# Patient Record
Sex: MALE | Race: WHITE | NOT HISPANIC OR LATINO | Employment: FULL TIME | URBAN - METROPOLITAN AREA
[De-identification: names, ages, dates, MRNs, and addresses within clinical notes are randomized per-mention and may not be internally consistent; named-entity substitution may affect disease eponyms.]

---
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
## 2021-05-13 NOTE — PROGRESS NOTES
Progress Note      Patient Name: Jimmy Thompson   Patient ID: [id number]   Sex: Male   YOB: 2000    Primary Care Provider: Gabriel Wing MD   Referring Provider: Gabriel Wing MD    Visit Date: May 14, 2020    Provider: Skye Baca MD   Location: [facility]   Location Address: [address] [place] [address]  [id number]   Location Phone: [phone]          Chief Complaint  · Burning in Chest x 1 week      History Of Present Illness  Jimmy Thompson is a 20 year old /White male who presents for evaluation and treatment of:      C/O burning sensation in chest x 1 week  some increased tightness    has hx of asthma    took albuterol inhaler and this did not seem to help    Has been around a lot of smokers lately    Denies fever, cough, nasal congestion, sore throat, body aches       Past Medical History  Disease Name Date Onset Notes   Acne --  --    Allergic rhinitis --  --    Asthma --  --    Broken Bones --  left wrist, right heel, right ankle, right foot, nasal   Forgetfulness --  --    Head injury 2016 --    Left knee pain --  --    Migraine Headaches --  --    Seizures --  last seizure age 4. benigin rolandic seizures.   Shortness of breath --  --    Wears glasses --  --          Past Surgical History  Procedure Name Date Notes   Adenoidectomy 2003 --    Ear Tubes 2003 --    nose 2003 caterization   Tonsilectomy 2003 --          Medication List  Name Date Started Instructions   albuterol sulfate 90 mcg/actuation inhalation HFA aerosol inhaler 11/05/2019 inhale 1 puff (90 mcg) by inhalation route every 6 hours as needed         Allergy List  Allergen Name Date Reaction Notes   NO KNOWN DRUG ALLERGIES --  --  --          Family Medical History  Disease Name Relative/Age Notes   Thyroid Gland Neoplasm, Malignant  --    Diabetes mellitus, type II  --          Social History  Finding Status Start/Stop Quantity Notes   Alcohol Never --/-- --   --    Heavy Amount of Exercise (4 or more times weekly) --  --/-- --  --    Tobacco Never --/-- --  --          Immunizations  NameDate Admin Mfg Trade Name Lot Number Route Inj VIS Given VIS Publication   DTaP01/28/2005 NE Not Entered  NE NE     Comments:    DTaP06/11/2002 NE Not Entered  NE NE     Comments:    DTaP2001 NE Not Entered  NE NE     Comments:    DTaP2001 NE Not Entered  NE NE     Comments:    DTaP2001 NE Not Entered  NE NE     Comments:    Hepatitis A06/29/2018 [facility] Havrix Peds 2 dose [id number] IM LD 06/29/2018 07/20/2016   Comments: pt tolerated well, left office in stable condition   Hepatitis  NE Not Entered  NE NE     Comments:    Hepatitis  NE Not Entered  NE NE     Comments:    Hepatitis  NE Not Entered  NE NE     Comments:    Hib06/11/2002 NE Not Entered  NE NE     Comments:    Hib2001 NE Not Entered  NE NE     Comments:    Hib2001 NE Not Entered  NE NE     Comments:    Hib2001 NE Not Entered  NE NE     Comments:    Mjetpuxep45/25/2016 [facility] Fluarix-PF > 3 Years [id number] IM LD 10/25/2016 07/02/2012   Comments: pt tolerated well   IPV2001 NE Not Entered  NE NE     Comments:    IPV2001 NE Not Entered  NE NE     Comments:    IPV2001 NE Not Entered  NE NE     Comments:    Meningococcal (MNG)07/18/2017 NE Not Entered  NE NE     Comments:    Meningococcal (MNG)02/20/2012 NE Not Entered  NE NE     Comments:    MMR01/28/2005 NE Not Entered  NE NE     Comments:    MMR04/24/2002 NE Not Entered  NE NE     Comments:    Prevnar 132001 NE Not Entered  NE NE     Comments:    Prevnar 132001 NE Not Entered  NE NE     Comments:    Prevnar 132001 NE Not Entered  NE NE     Comments:    Tdap02/20/2012 NE Not Entered  NE NE     Comments:    Dckfszsdy40/23/2008 NE Not Entered  NE NE     Comments:    Wducojlmh09/11/2002 NE Not Entered  NE NE     Comments:          Review of Systems  · Constitutional  o Denies  o : fever,  "fatigue, weight loss, weight gain  · HENT  o Denies  o : nasal congestion, sore throat  · Cardiovascular  o Denies  o : lower extremity edema, claudication, chest pressure, palpitations  · Respiratory  o Denies  o : frequent cough, shortness of breath  · Gastrointestinal  o Denies  o : nausea, vomiting, diarrhea, constipation, abdominal pain      Vitals  Date Time BP Position Site L\R Cuff Size HR RR TEMP (F) WT  HT  BMI kg/m2 BSA m2 O2 Sat HC       05/08/2019 09:13 /72 Sitting    71 - R 12 98.6 128lbs 0oz 5'  2\" 23.41 1.59 100 %    09/23/2019 11:09 /62 Sitting    74 - R 15 98.6 120lbs 0oz 5'  2\" 21.95 1.54 100 %    05/14/2020 03:20 /64 Sitting    85 - R  98.9 125lbs 0oz 5'  2\" 22.86 1.57 98 %          Physical Examination  · Constitutional  o Appearance  o : no acute distress, well-nourished  · Head and Face  o Head  o :   § Inspection  § : atraumatic, normocephalic  · Eyes  o Eyes  o : extraocular movements intact, no scleral icterus, no conjunctival injection  · Ears, Nose, Mouth and Throat  o Ears  o :   § External Ears  § : normal  § Otoscopic Examination  § : tympanic membrane appearance within normal limits bilaterally  o Nose  o :   § Intranasal Exam  § : nares patent  o Oral Cavity  o :   § Oral Mucosa  § : moist mucous membranes  o Throat  o :   § Oropharynx  § : no inflammation or lesions present, tonsils within normal limits  · Respiratory  o Respiratory Effort  o : breathing comfortably, symmetric chest rise  o Auscultation of Lungs  o : clear to asculatation bilaterally, no wheezes, rales, or rhonchii  · Cardiovascular  o Heart  o :   § Auscultation of Heart  § : regular rate and rhythm, no murmurs, rubs, or gallops  o Peripheral Vascular System  o :   § Extremities  § : no edema  · Lymphatic  o Neck  o : no lymphadenopathy present  · Neurologic  o Mental Status Examination  o :   § Orientation  § : grossly oriented to person, place and time  o Gait and Station  o :   § Gait " Screening  § : normal gait  · Psychiatric  o General  o : normal mood and affect          Assessment  · Chest tightness     786.59/R07.89  patient with hx of asthma  no wheezing or other abnormal exam findings, vital signs normal  will treat with inhaled steroid, continue rescue inhaler as needed  call or return to clinic if worsening or develops new symptoms    Problems Reconciled  Plan  · Orders  o ACO-39: Current medications updated and reviewed () - - 05/14/2020  · Medications  o Qvar RediHaler 40 mcg/actuation inhalation HFA aerosol breath activated   SIG: inhale 1 puff (40 mcg) by inhalation route 2 times per day   DISP: (1) Hfa aerosol breath activated with 0 refills  Prescribed on 05/14/2020     o Medications have been Reconciled  o Transition of Care or Provider Policy  · Instructions  o Take all medications as prescribed/directed.  o Patient was educated/instructed on their diagnosis, treatment and medications prior to discharge from the clinic today.  o Patient instructed to seek medical attention urgently for new or worsening symptoms.  o Call the office with any concerns or questions.  · Disposition  o Call or Return if symptoms worsen or persist.  o follow up as needed            Electronically Signed by: Skye Baca MD -Author on May 14, 2020 03:35:12 PM

## 2021-05-15 VITALS
HEART RATE: 71 BPM | RESPIRATION RATE: 12 BRPM | TEMPERATURE: 98.6 F | HEIGHT: 62 IN | OXYGEN SATURATION: 100 % | SYSTOLIC BLOOD PRESSURE: 116 MMHG | BODY MASS INDEX: 23.55 KG/M2 | DIASTOLIC BLOOD PRESSURE: 72 MMHG | WEIGHT: 128 LBS

## 2021-05-15 VITALS
HEART RATE: 74 BPM | OXYGEN SATURATION: 100 % | DIASTOLIC BLOOD PRESSURE: 62 MMHG | TEMPERATURE: 98.6 F | SYSTOLIC BLOOD PRESSURE: 148 MMHG | WEIGHT: 120 LBS | RESPIRATION RATE: 15 BRPM | BODY MASS INDEX: 22.08 KG/M2 | HEIGHT: 62 IN

## 2021-05-15 VITALS
HEART RATE: 85 BPM | SYSTOLIC BLOOD PRESSURE: 108 MMHG | HEIGHT: 62 IN | BODY MASS INDEX: 23 KG/M2 | WEIGHT: 125 LBS | OXYGEN SATURATION: 98 % | DIASTOLIC BLOOD PRESSURE: 64 MMHG | TEMPERATURE: 98.9 F

## 2021-05-16 VITALS
HEART RATE: 59 BPM | HEIGHT: 62 IN | RESPIRATION RATE: 16 BRPM | DIASTOLIC BLOOD PRESSURE: 58 MMHG | TEMPERATURE: 98.7 F | OXYGEN SATURATION: 99 % | SYSTOLIC BLOOD PRESSURE: 132 MMHG | WEIGHT: 118.25 LBS | BODY MASS INDEX: 21.76 KG/M2

## 2021-05-16 VITALS
SYSTOLIC BLOOD PRESSURE: 128 MMHG | WEIGHT: 126.25 LBS | BODY MASS INDEX: 23.23 KG/M2 | OXYGEN SATURATION: 100 % | RESPIRATION RATE: 15 BRPM | HEIGHT: 62 IN | HEART RATE: 83 BPM | TEMPERATURE: 99 F | DIASTOLIC BLOOD PRESSURE: 72 MMHG

## 2021-05-16 VITALS — WEIGHT: 123 LBS | RESPIRATION RATE: 18 BRPM | HEIGHT: 62 IN | BODY MASS INDEX: 22.63 KG/M2

## 2021-05-16 VITALS
SYSTOLIC BLOOD PRESSURE: 112 MMHG | HEART RATE: 71 BPM | TEMPERATURE: 98.8 F | OXYGEN SATURATION: 100 % | DIASTOLIC BLOOD PRESSURE: 64 MMHG | WEIGHT: 124.5 LBS | HEIGHT: 62 IN | RESPIRATION RATE: 16 BRPM | BODY MASS INDEX: 22.91 KG/M2

## 2021-05-16 VITALS
BODY MASS INDEX: 22.63 KG/M2 | HEART RATE: 60 BPM | DIASTOLIC BLOOD PRESSURE: 84 MMHG | SYSTOLIC BLOOD PRESSURE: 132 MMHG | WEIGHT: 123 LBS | HEIGHT: 62 IN | OXYGEN SATURATION: 100 % | TEMPERATURE: 98.2 F

## 2021-05-16 VITALS — HEIGHT: 62 IN | WEIGHT: 126 LBS | BODY MASS INDEX: 23.19 KG/M2 | RESPIRATION RATE: 16 BRPM

## 2021-05-16 VITALS — RESPIRATION RATE: 12 BRPM | BODY MASS INDEX: 22.82 KG/M2 | WEIGHT: 124 LBS | HEIGHT: 62 IN

## 2021-05-16 VITALS — BODY MASS INDEX: 22.63 KG/M2 | HEIGHT: 62 IN | WEIGHT: 123 LBS | RESPIRATION RATE: 16 BRPM

## 2021-07-30 ENCOUNTER — APPOINTMENT (OUTPATIENT)
Dept: GENERAL RADIOLOGY | Facility: HOSPITAL | Age: 21
End: 2021-07-30

## 2021-07-30 ENCOUNTER — HOSPITAL ENCOUNTER (EMERGENCY)
Facility: HOSPITAL | Age: 21
Discharge: HOME | End: 2021-07-30
Attending: EMERGENCY MEDICINE | Admitting: EMERGENCY MEDICINE

## 2021-07-30 VITALS
BODY MASS INDEX: 24.14 KG/M2 | WEIGHT: 131.17 LBS | DIASTOLIC BLOOD PRESSURE: 72 MMHG | TEMPERATURE: 97.7 F | HEIGHT: 62 IN | RESPIRATION RATE: 16 BRPM | HEART RATE: 67 BPM | OXYGEN SATURATION: 100 % | SYSTOLIC BLOOD PRESSURE: 136 MMHG

## 2021-07-30 DIAGNOSIS — S66.912A SPRAIN AND STRAIN OF LEFT WRIST: ICD-10-CM
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
## 2021-07-30 DIAGNOSIS — S63.502A SPRAIN AND STRAIN OF LEFT WRIST: ICD-10-CM

## 2021-07-30 PROCEDURE — 99283 EMERGENCY DEPT VISIT LOW MDM: CPT

## 2021-07-30 PROCEDURE — 73110 X-RAY EXAM OF WRIST: CPT

## 2021-07-30 RX ORDER — DICLOFENAC SODIUM 75 MG/1
75 TABLET, DELAYED RELEASE ORAL 2 TIMES DAILY PRN
Qty: 20 TABLET | Refills: 0 | OUTPATIENT
Start: 2021-07-30 | End: 2022-12-28

## 2021-08-02 ENCOUNTER — TELEPHONE (OUTPATIENT)
Dept: ORTHOPEDIC SURGERY | Facility: CLINIC | Age: 21
End: 2021-08-02

## 2021-08-03 ENCOUNTER — OFFICE VISIT (OUTPATIENT)
Dept: ORTHOPEDIC SURGERY | Facility: CLINIC | Age: 21
End: 2021-08-03

## 2021-08-03 VITALS — BODY MASS INDEX: 24.11 KG/M2 | HEART RATE: 91 BPM | OXYGEN SATURATION: 97 % | WEIGHT: 131 LBS | HEIGHT: 62 IN

## 2021-08-03 DIAGNOSIS — M25.532 LEFT WRIST PAIN: ICD-10-CM

## 2021-08-03 PROCEDURE — 99203 OFFICE O/P NEW LOW 30 MIN: CPT | Performed by: STUDENT IN AN ORGANIZED HEALTH CARE EDUCATION/TRAINING PROGRAM

## 2021-08-03 PROCEDURE — 25600 CLTX DST RDL FX/EPHYS SEP WO: CPT | Performed by: STUDENT IN AN ORGANIZED HEALTH CARE EDUCATION/TRAINING PROGRAM

## 2021-08-03 NOTE — PROGRESS NOTES
"Chief Complaint  Pain of the Left Wrist    Subjective          Jimmy Thompson presents to [facility] for   History of Present Illness    Jimmy presents today for an evaluation of left wrist pain. 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 Went to the ER on 7/31/21 where he recieved xrays and was wrapped in a splint. Pain is moderate but controlled. Doing ok with splint. Right hand dominant. He is a  at [employer] and part time  at a gas station. Overall health is good. No diabetes.   No Known Allergies     Social History     Socioeconomic History   • Marital status: Single     Spouse name: Not on file   • Number of children: Not on file   • Years of education: Not on file   • Highest education level: Not on file   Tobacco Use   • Smoking status: Never Smoker   • Smokeless tobacco: Never Used   Vaping Use   • Vaping Use: Never used   Substance and Sexual Activity   • Alcohol use: Never   • Sexual activity: Defer        I reviewed the patient's chief complaint, history of present illness, review of systems, past medical history, surgical history, family history, social history, medications, and allergy list.     REVIEW OF SYSTEMS    Constitutional: Denies fevers, chills, weight loss  Cardiovascular: Denies chest pain, shortness of breath  Skin: Denies rashes, acute skin changes  Neurologic: Denies headache, loss of consciousness  MSK: left wrist pain      Objective   Vital Signs:   Pulse 91   Ht 157.5 cm (62\")   Wt 59.4 kg (131 lb)   SpO2 97%   BMI 23.96 kg/m²     Body mass index is 23.96 kg/m².    Physical Exam    General: Alert, no acute distress  Left upper extremity: short arm splint removed. Mild tenderness over the radial styloid. Mild swelling on radial aspect of the wrist. Moderately tender to palpation over radial snuff box. Nontender over the DRUJ, distal ulna. Mild tenderness over scaphoid " tubercle. 45 degree wrist extension. 45 degree wrist flexion with pain. Full finger flexion and extension. Sensation intact in the  median, radial, ulnar nerve distributions. Palpable radial pulse. Nontender over over radial head, olecranon, medial and lateral epicondyle. Full elbow flexion and extension.     Orthopedic Injury Treatment    Date/Time: 8/3/2021 9:41 AM  Performed by: Bismark Graves MD  Authorized by: Bismark Graves MD   Injury location: finger  Location details: left thumb  Pre-procedure neurovascular assessment: neurovascularly intact    Anesthesia:  Local anesthesia used: no    Sedation:  Patient sedated: no    Immobilization: cast  Supplies used: cotton padding (FIBERGLASS CAST)  Post-procedure neurovascular assessment: post-procedure neurovascularly intact  Patient tolerance: patient tolerated the procedure well with no immediate complications  Comments: Closed treatment was obtained and fiberglass cast was applied.  The patient tolerated the procedure without any complications. APPLIED BY GRIFFIN ESPARZA MA          Imaging Results (Most Recent)     None              Assessment and Plan    Diagnoses and all orders for this visit:    1. Left wrist pain    Other orders  -     Orthopedic Injury Treatment        No obvious fracture noted radiographically, but tender to palpation over the radial snuffbox and scaphoid tubercle. We discussed thumb spica immobilization for 2 weeks and re evaluation. Patient expressed understanding and agreed to proceed. Thumb spica cast applied today. Patient tolerated well with no complications. Follow up in 2 weeks. We will remove the cast and obtain new XR of the left wrist when he returns.     Scribed for Bismark Graves MD by Breanna Joshi MA.  08/03/21   08:17 EDT    Follow Up {Instructions Charge Capture  Follow-up Communications :23}  Return in about 2 weeks (around 8/17/2021).   Follow up in 2 weeks  Patient was given instructions and counseling regarding his  condition or for health maintenance advice. Please see specific information pulled into the AVS if appropriate.

## 2021-08-18 ENCOUNTER — OFFICE VISIT (OUTPATIENT)
Dept: ORTHOPEDIC SURGERY | Facility: CLINIC | Age: 21
End: 2021-08-18

## 2021-08-18 VITALS — HEIGHT: 62 IN | OXYGEN SATURATION: 98 % | WEIGHT: 130.8 LBS | HEART RATE: 52 BPM | BODY MASS INDEX: 24.07 KG/M2

## 2021-08-18 DIAGNOSIS — S52.615A NONDISPLACED FRACTURE OF LEFT ULNA STYLOID PROCESS, INITIAL ENCOUNTER FOR CLOSED FRACTURE: Primary | ICD-10-CM

## 2021-08-18 DIAGNOSIS — M25.532 LEFT WRIST PAIN: ICD-10-CM

## 2021-08-18 PROCEDURE — 99024 POSTOP FOLLOW-UP VISIT: CPT | Performed by: STUDENT IN AN ORGANIZED HEALTH CARE EDUCATION/TRAINING PROGRAM

## 2021-08-18 PROCEDURE — 29075 APPL CST ELBW FNGR SHORT ARM: CPT | Performed by: STUDENT IN AN ORGANIZED HEALTH CARE EDUCATION/TRAINING PROGRAM

## 2021-08-18 NOTE — PROGRESS NOTES
"Chief Complaint  Pain of the Left Wrist    Subjective          Jimmy Thompson presents to [facility] for   History of Present Illness    Jimmy returns today for follow-up of his left wrist.  He developed left wrist pain after a fall but initial x-rays were negative.  Due to his exam we placed him in a cast at our previous visit.  He reports improved, but persistent pain since our previous visit.  Denies any new injuries.  Cast was removed today for xrays. No complications with cast.  Denies numbness.    No Known Allergies     Social History     Socioeconomic History   • Marital status: Single     Spouse name: Not on file   • Number of children: Not on file   • Years of education: Not on file   • Highest education level: Not on file   Tobacco Use   • Smoking status: Never Smoker   • Smokeless tobacco: Never Used   Vaping Use   • Vaping Use: Never used   Substance and Sexual Activity   • Alcohol use: Never   • Sexual activity: Defer        I reviewed the patient's chief complaint, history of present illness, review of systems, past medical history, surgical history, family history, social history, medications, and allergy list.     REVIEW OF SYSTEMS    Constitutional: Denies fevers, chills, weight loss  Cardiovascular: Denies chest pain, shortness of breath  Skin: Denies rashes, acute skin changes  Neurologic: Denies headache, loss of consciousness  MSK: left wrist pain      Objective   Vital Signs:   Pulse 52   Ht 157.5 cm (62\")   Wt 59.3 kg (130 lb 12.8 oz)   SpO2 98%   BMI 23.92 kg/m²     Body mass index is 23.92 kg/m².    Physical Exam    General: Alert, no acute distress   Left upper extremity: No wounds after cast removal. Nontender over the DRUJ, distal radius, hand, fingers.  Moderate tenderness over the ulnar styloid.  Mild tenderness over radial snuffbox. Moderate tenderness over tfcc. 50 degrees wrist flexion. 50 degrees wrist extension with pain over the dorsal joint " line . Full active finger flexion and extension. Sensation intact in the median, radial, ulnar nerve distributions.  Palpable radial pulse. 80 degrees supination. 90 degrees pronation.    Orthopedic Injury Treatment    Date/Time: 8/18/2021 8:04 AM  Performed by: Bismark Graves MD  Authorized by: Bismark Graves MD   Injury location: wrist  Location details: left wrist  Pre-procedure neurovascular assessment: neurovascularly intact    Anesthesia:  Local anesthesia used: no    Sedation:  Patient sedated: no    Immobilization: cast  Supplies used: cotton padding (FIBERGLASS)  Post-procedure neurovascular assessment: post-procedure neurovascularly intact  Patient tolerance: patient tolerated the procedure well with no immediate complications  Comments: Patient was placed in fiberglass cast today.  The patient tolerated the procedure without any complications. APPLIED BY GRIFFIN ESPARZA MA          Imaging Results (Most Recent)     Procedure Component Value Units Date/Time    XR Wrist 2 View Left [[id number]] Resulted: 08/18/21 1350     Updated: 08/18/21 1351    Narrative:      Indications: Follow-up left wrist pain    Views: AP and lateral left wrist    Findings: Nondisplaced fracture of the left ulnar styloid seen on today's   films. No additional fractures noted. Joints well aligned.    Comparative Data: Comparative data found and reviewed today                  Assessment and Plan    Diagnoses and all orders for this visit:    1. Nondisplaced fracture of left ulna styloid process, initial encounter for closed fracture (Primary)    2. Left wrist pain  -     XR Wrist 2 View Left    Other orders  -     Orthopedic Injury Treatment    Jimmy's Xrays today show a Nondisplced ulna styloid fracture. He is 3 weeks out from injury. Discussed nonoperative management for fracture. Applied cast today.  Cast care reviewed.  Patient had no complications and tolerated well. Discussed 3 weeks in a cast and then progress to brace upon  reevaluation. Follow up in 3 weeks.  We will remove his cast and obtain new xrays of left wrist next visit.      Scribed for Bismark Graves MD by Breanna Joshi MA.  08/18/21   07:40 EDT    Follow Up   Return in about 3 weeks (around 9/8/2021).  Patient was given instructions and counseling regarding his condition or for health maintenance advice. Please see specific information pulled into the AVS if appropriate.

## 2022-12-02 PROBLEM — L70.9 ACNE: Status: ACTIVE | Noted: 2022-12-02

## 2022-12-02 PROBLEM — R56.9 SEIZURES (HCC): Status: ACTIVE | Noted: 2022-12-02

## 2024-04-04 ENCOUNTER — TELEMEDICINE (OUTPATIENT)
Dept: FAMILY MEDICINE CLINIC | Facility: TELEHEALTH | Age: 24
End: 2024-04-04
Payer: COMMERCIAL

## 2024-04-04 DIAGNOSIS — J06.9 VIRAL URI: Primary | ICD-10-CM

## 2024-04-04 RX ORDER — BROMPHENIRAMINE MALEATE, PSEUDOEPHEDRINE HYDROCHLORIDE, AND DEXTROMETHORPHAN HYDROBROMIDE 2; 30; 10 MG/5ML; MG/5ML; MG/5ML
10 SYRUP ORAL 4 TIMES DAILY PRN
Qty: 240 ML | Refills: 0 | Status: SHIPPED | OUTPATIENT
Start: 2024-04-04

## 2024-04-04 NOTE — LETTER
April 4, 2024     Patient: Jimmy Thompson   YOB: 2000   Date of Visit: 4/4/2024       To Whom It May Concern:    It is my medical opinion that Jimmy Thompson may return to school tomorrow on 4/5/2024.           Sincerely,    PAMELA Altamirano    CC:   No Recipients

## 2024-04-04 NOTE — PATIENT INSTRUCTIONS
Treat symptoms.  Alternate tylenol and motrin for pain and/or fever, stay hydrated and rest.     If symptoms worsen or do not improve follow up with your PCP or visit your nearest Urgent Care Center or ER.

## 2024-04-04 NOTE — PROGRESS NOTES
Subjective   Chief Complaint   Patient presents with    URI       Jimmy Thompson is a 24 y.o. male.     History of Present Illness  Patient reports congestion, headache, fatigue, chills and cough that started yesterday. Symptoms are gradually worsening.  His girlfriend has been sick with similar symptoms, she tested negative for COVID.    URI   This is a new problem. The current episode started yesterday. The problem has been unchanged. There has been no fever. Associated symptoms include congestion, coughing, headaches and a sore throat. Pertinent negatives include no abdominal pain, chest pain, diarrhea, dysuria, ear pain, nausea, vomiting or wheezing. He has tried nothing for the symptoms.        No Known Allergies    Past Medical History:   Diagnosis Date    Acne     Allergic rhinitis     Anemia     Asthma     Broken bones     LEFT WRIST, RIGHT HEEL, RIGHT ANKEL, RIGHT FOOT, NASAL    Family history of migraine headaches     Forgetfulness     Head injury 2016    Left knee pain     Seizures     Shortness of breath     LAST SEIZURE AGE 4. BENIGN ROLANDIC SEIZURES.     Wears glasses        Past Surgical History:   Procedure Laterality Date    ADENOIDECTOMY  2003    EAR TUBES  2003    NOSE SURGERY  2003    CATERIZATION     TONSILLECTOMY  2003       Social History     Socioeconomic History    Marital status: Single   Tobacco Use    Smoking status: Never    Smokeless tobacco: Never   Vaping Use    Vaping status: Never Used   Substance and Sexual Activity    Alcohol use: Never    Sexual activity: Defer       Family History   Problem Relation Age of Onset    Other Other         THYROID GLAND CANCER, MALIGNANT     Diabetes type II Other          Current Outpatient Medications:     albuterol sulfate  (90 Base) MCG/ACT inhaler, Inhale 2 puffs Every 4 (Four) Hours As Needed for Wheezing., Disp: 8 g, Rfl: 0    brompheniramine-pseudoephedrine-DM 30-2-10 MG/5ML syrup, Take 10 mL by mouth 4 (Four) Times a Day As Needed  for Allergies, Cough or Congestion., Disp: 240 mL, Rfl: 0      Review of Systems   Constitutional:  Positive for fatigue. Negative for chills, diaphoresis and fever.   HENT:  Positive for congestion and sore throat. Negative for ear pain.    Respiratory:  Positive for cough. Negative for chest tightness, shortness of breath and wheezing.    Cardiovascular:  Negative for chest pain.   Gastrointestinal:  Negative for abdominal pain, diarrhea, nausea and vomiting.   Genitourinary:  Negative for dysuria.   Neurological:  Positive for headache.        There were no vitals filed for this visit.    Objective   Physical Exam  Constitutional:       General: He is not in acute distress.     Appearance: Normal appearance. He is not ill-appearing, toxic-appearing or diaphoretic.   HENT:      Head: Normocephalic.      Nose: Congestion present.      Mouth/Throat:      Lips: Pink.      Mouth: Mucous membranes are moist.   Pulmonary:      Effort: Pulmonary effort is normal.   Neurological:      Mental Status: He is alert and oriented to person, place, and time.          Procedures     Assessment & Plan   Diagnoses and all orders for this visit:    1. Viral URI (Primary)  -     brompheniramine-pseudoephedrine-DM 30-2-10 MG/5ML syrup; Take 10 mL by mouth 4 (Four) Times a Day As Needed for Allergies, Cough or Congestion.  Dispense: 240 mL; Refill: 0    Treat symptoms.  Alternate tylenol and motrin for pain and/or fever, stay hydrated and rest.     If symptoms worsen or do not improve follow up with your PCP or visit your nearest Urgent Care Center or ER.        PLAN: Discussed dosing, side effects, recommended other symptomatic care.  Patient should follow up with primary care provider, Urgent Care or ER if symptoms worsen, fail to resolve or other symptoms need attention. Patient/family agree to the above.         PAMELA Altamirano     The use of a video visit has been reviewed with the patient and verbal informed consent has  been obtained. Myself and Jimmy Thompson participated in this visit. The patient is located at [address]  [address]  [place] [address]. I am located in [place], [place]. Mychart and Zoom were utilized.        This visit was performed via Telehealth.  This patient has been instructed to follow-up with their primary care provider if their symptoms worsen or the treatment provided does not resolve their illness.